# Patient Record
Sex: MALE | Race: WHITE | Employment: FULL TIME | URBAN - METROPOLITAN AREA
[De-identification: names, ages, dates, MRNs, and addresses within clinical notes are randomized per-mention and may not be internally consistent; named-entity substitution may affect disease eponyms.]

---

## 2023-08-08 ENCOUNTER — OFFICE VISIT (OUTPATIENT)
Facility: CLINIC | Age: 53
End: 2023-08-08

## 2023-08-08 ENCOUNTER — TELEPHONE (OUTPATIENT)
Facility: CLINIC | Age: 53
End: 2023-08-08

## 2023-08-08 VITALS — HEIGHT: 68 IN | WEIGHT: 173 LBS | BODY MASS INDEX: 26.22 KG/M2

## 2023-08-08 DIAGNOSIS — Z12.11 COLON CANCER SCREENING: Primary | ICD-10-CM

## 2023-08-08 DIAGNOSIS — Z86.010 HISTORY OF COLON POLYPS: ICD-10-CM

## 2023-08-08 DIAGNOSIS — Z80.0 FH: COLON CANCER: ICD-10-CM

## 2023-08-08 DIAGNOSIS — K21.9 GASTROESOPHAGEAL REFLUX DISEASE, UNSPECIFIED WHETHER ESOPHAGITIS PRESENT: ICD-10-CM

## 2023-08-08 DIAGNOSIS — K21.9 GASTROESOPHAGEAL REFLUX DISEASE WITHOUT ESOPHAGITIS: ICD-10-CM

## 2023-08-08 DIAGNOSIS — Z86.010 HX OF COLONIC POLYPS: ICD-10-CM

## 2023-08-08 DIAGNOSIS — Z80.0 FAMILY HISTORY OF COLON CANCER: ICD-10-CM

## 2023-08-08 PROCEDURE — 3008F BODY MASS INDEX DOCD: CPT | Performed by: PHYSICIAN ASSISTANT

## 2023-08-08 PROCEDURE — S0285 CNSLT BEFORE SCREEN COLONOSC: HCPCS | Performed by: PHYSICIAN ASSISTANT

## 2023-08-08 RX ORDER — TRAZODONE HYDROCHLORIDE 50 MG/1
50 TABLET ORAL NIGHTLY
COMMUNITY

## 2023-08-08 RX ORDER — OMEPRAZOLE 20 MG/1
20 CAPSULE, DELAYED RELEASE ORAL DAILY
COMMUNITY

## 2023-08-08 RX ORDER — ATORVASTATIN CALCIUM 10 MG/1
10 TABLET, FILM COATED ORAL DAILY
COMMUNITY

## 2023-08-08 RX ORDER — TADALAFIL 5 MG/1
5 TABLET ORAL DAILY
COMMUNITY
Start: 2021-08-11

## 2023-08-08 RX ORDER — LOSARTAN POTASSIUM 50 MG/1
50 TABLET ORAL DAILY
COMMUNITY

## 2023-08-08 RX ORDER — POLYETHYLENE GLYCOL 3350, SODIUM CHLORIDE, SODIUM BICARBONATE, POTASSIUM CHLORIDE 420; 11.2; 5.72; 1.48 G/4L; G/4L; G/4L; G/4L
POWDER, FOR SOLUTION ORAL
Qty: 1 EACH | Refills: 0 | Status: SHIPPED | OUTPATIENT
Start: 2023-08-08

## 2023-08-08 RX ORDER — BUPROPION HYDROCHLORIDE 300 MG/1
300 TABLET ORAL EVERY MORNING
COMMUNITY

## 2023-08-08 NOTE — TELEPHONE ENCOUNTER
Scheduled for:  Colonoscopy [address]; [phone]  Provider Name:  Dr Portillo Molina  Date:  12/19/2023  Location:  [facility]  Sedation:  MAC  Time:  2:00pm (pt is aware to arrive at 1:00pm)    Prep:  Colyte  Meds/Allergies Reconciled?:  Physician reviewed  Diagnosis with codes:  CCS Z12.11; [address] 0; Hx of colon polyps z86.010; GERD K21.9  Was patient informed to call insurance with codes (Y/N):       Referral sent?:  Referral was sent at the time of electronic surgical scheduling. [facility][note 3] or [address] notified?:  I sent an electronic request to Endo Scheduling and received a confirmation today. Medication Orders:  Pt is aware to NOT take iron pills, herbal meds and diet supplements for 7 days before exam. Also to NOT take any form of alcohol, recreational drugs and any forms of ED meds 24 hours before exam.     Misc Orders:       Further instructions given by staff:  I discussed the prep intructions with the patient in office which HE verbally understood. Copy of instructions was handed to patient as well. Patient was also advised about cancellation policy.

## 2023-08-08 NOTE — PATIENT INSTRUCTIONS
1. Schedule colonoscopy/EGD with Dr. Dodie Melara or Dr. Tre Carranza with [facility] [Diagnosis: crc screening, family hx of colon cancer, personal history of colon polyps,  GERD]    2.  bowel prep from pharmacy (split trilyte)    3. Continue all medications as normal for your procedure. 4. Read all bowel prep instructions carefully. Bowel prep instructions can also be found online at:  [email or web address].org/giprep     5. AVOID seeds, nuts, popcorn, raw fruits and vegetables for 3 days before procedure    6. You MAY need to go for COVID testing 72 hours before procedure. The testing team will call you a few days before your procedure to discuss with you if testing is required. If you are asked to go for COVID testing and do not completed the test, the procedure cannot be performed. 7. If you start any NEW medication after your visit today, please notify us. Certain medications (like iron or weight loss medications) will need to be held before the procedure, or the procedure cannot be performed safely.

## 2023-08-08 NOTE — H&P
[facility], [facility] - Gastroenterology                                                                                                               Reason for consult: Patient presents with:  Colonoscopy Screening: [facility]      Requesting physician or provider: Tani Frye MD      HPI:   Raymundo Rivas is a 48year old year-old male with history of HTN, anxiety/depression who presents for crc screening. High risk. Started cln at age 27. Was getting cln every 5 years. At 48 had cln remarkable for tubular adenoma. Family hx of cln cancer. No prior genetic testing. he moves his bowels three  times per day and without recent change. he denies straining and/or incomplete evacuation. he denies brbpr and/or melena. Does eat high fiber diet. Does struggle with acid reflux. Does take omeprazole twice daily. He can still have nocturnal symptom. he denies dysphagia, odynophagia and/or globus. he denies abdominal pain. he denies nausea and/or vomiting. he denies recent change in appetite and/or unintentional weight loss. he denies bloating. NSAIDS: baby asa  Tobacco: none  Alcohol: social  Marijuana: none  Illicit drugs: none    FH GI malignancyYes: paternal grandmother and grandfather    No history of adverse reaction to sedation  No anticoagulants  No pacemaker/defibrillator  No pain medications and/or sleep aides      Last colonoscopy: every 5 years since age 27, most recent at age 48 remarkable for tubular adenomas  -plan for 3 year recall   Last EGD: None    Wt Readings from Last 6 Encounters:  08/08/23 : 173 lb (78.5 kg)  09/15/08 : 165 lb 6 oz (75 kg)  01/02/08 : 155 lb (70.3 kg)  09/21/07 : 159 lb (72.1 kg)  07/12/07 : 161 lb (73 kg)       History, Medications, Allergies, ROS:      Past Medical History:   Diagnosis Date    Essential hypertension     Hyperlipidemia       History reviewed. No pertinent surgical history. Family Hx: History reviewed.  No pertinent family history. Social History:   Social History     Socioeconomic History    Marital status:    Tobacco Use    Smoking status: Never    Smokeless tobacco: Never   Substance and Sexual Activity    Alcohol use: Yes        Medications (Active prior to today's visit):  Current Outpatient Medications   Medication Sig Dispense Refill    losartan 50 MG Oral Tab Take 1 tablet (50 mg total) by mouth daily. omeprazole 20 MG Oral Capsule Delayed Release Take 1 capsule (20 mg total) by mouth daily. buPROPion  MG Oral Tablet 24 Hr Take 1 tablet (300 mg total) by mouth every morning. atorvastatin 10 MG Oral Tab Take 1 tablet (10 mg total) by mouth daily. tadalafil 5 MG Oral Tab Take 1 tablet (5 mg total) by mouth daily. traZODone 50 MG Oral Tab Take 1 tablet (50 mg total) by mouth nightly. PEG 3350-KCl-Na Bicarb-NaCl (TRILYTE) 420 g Oral Recon Soln Take prep as directed by gastro office. May substitute with Trilyte/generic equivalent if needed. 1 each 0       Allergies:    Ephedrine                   Comment:Lose of bladder control  Pcns [Penicillins]      HIVES    ROS:   CONSTITUTIONAL: negative for fevers, chills, sweats and weight loss  EYES Negative for red eyes, yellow eyes, changes in vision  HEENT: Negative for dysphagia and hoarseness  RESPIRATORY: Negative for cough and shortness of breath  CARDIOVASCULAR: Negative for chest pain, palpitations  GASTROINTESTINAL: See HPI  GENITOURINARY: Negative for dysuria and frequency  MUSCULOSKELETAL: Negative for arthralgias and myalgias  NEUROLOGICAL: Negative for dizziness and headaches  BEHAVIOR/PSYCH: Negative for anxiety and poor appetite    PHYSICAL EXAM:   Height 5' 8\" (1.727 m), weight 173 lb (78.5 kg).     GEN: WD/WN, NAD  HEENT: Supple symmetrical, trachea midline  CV: RRR, the extremities are warm and well perfused   LUNGS: No increased work of breathing  ABDOMEN: No scars, normal bowel sounds, soft, non-tender, non-distended no rebound or guarding, no masses, no hepatomegaly  MSK: No redness, no warmth, no swelling of joints  SKIN: No jaundice, no erythema, no rashes  HEMATOLOGIC: No bleeding, no bruising  NEURO: Alert and interactive, normal gait    Labs/Imaging/Procedures:     Patient's pertinent labs and imaging were reviewed and discussed with patient today. Lab Results   Component Value Date    WBC 4.7 01/02/2008    MCV 92 01/02/2008    MCH 32.1 01/02/2008    MCHC 34.9 01/02/2008    RDW 13.1 01/02/2008     01/02/2008        Lab Results   Component Value Date    BUN 12 09/19/2008    CREATSERUM 1.00 09/19/2008    GFR >60 09/19/2008    ALKPHO 70 07/16/2007    AST 16 07/16/2007    ALT 19 07/16/2007    TP 7.0 07/16/2007    ALB 4.3 07/16/2007    AGRATIO 1.6 07/16/2007     09/19/2008    K 4.4 09/19/2008     09/19/2008    CO2 21 09/19/2008      . ASSESSMENT/PLAN:   Theo Leach is a 48year old year-old male with history of HTN, anxiety/depression who presents for crc screening. #crc screening  #personal hx of polyps  #family hx of colon cancer  Patient presents for colon recall. Last cln 3 years ago remarkable for tubular adenoma, performed at [facility] [place], operated by [facility]. Advised for 3 year recall. He endorses family hx of colon cancer. He notes his bowel movements are daily without change. Denies melena, brbpr. No abdominal pain. Plan for colonoscopy at this time. Patient agreeable. #GERD  Patient endorses GERD on omeprazole and does have night time symptoms on medication. Advised for EGD to evaluate for possible Browning's or esophagitis. Patient is agreeable. 1. Schedule colonoscopy/EGD with Dr. Anjelica Tran or [place] Lashay Loaiza [place] [facility] [Diagnosis: crc screening, family hx of colon cancer, personal history of colon polyps,  GERD]    2.  bowel prep from pharmacy (split trilyte)    3. Continue all medications as normal for your procedure. 4. Read all bowel prep instructions carefully.  Bowel prep instructions can also be found online at:  [email or web address]/giprep     5. AVOID seeds, nuts, popcorn, raw fruits and vegetables for 3 days before procedure    6. You MAY need to go for COVID testing 72 hours before procedure. The testing team will call you a few days before your procedure to discuss with you if testing is required. If you are asked to go for COVID testing and do not completed the test, the procedure cannot be performed. 7. If you start any NEW medication after your visit today, please notify us. Certain medications (like iron or weight loss medications) will need to be held before the procedure, or the procedure cannot be performed safely. Orders This Visit:  No orders of the defined types were placed in this encounter. Meds This Visit:  Requested Prescriptions     Signed Prescriptions Disp Refills    PEG 3350-KCl-Na Bicarb-NaCl (TRILYTE) 420 g Oral Recon Soln 1 each 0     Sig: Take prep as directed by gastro office. May substitute with Trilyte/generic equivalent if needed. Imaging & Referrals:  None    ENDOSCOPIC RISK BENEFIT DISCUSSION: I described the procedure in great detail with the patient. I discussed the risks and benefits, including but not limited to: bleeding, perforation, infection, anesthesia complications, and even death. Patient will be NPO after midnight and will have a person physically present at time of pick-up to drive patient home. Patient verbalized understanding and agrees to proceed with procedure as planned. Livia Ruiz PA-C   8/8/2023        This note was partially prepared using [employer] [facility] [employer] voice recognition dictation software. As a result, errors may occur. When identified, these errors have been corrected.  While every attempt is made to correct errors during dictation, discrepancies may still exist.

## 2023-12-19 ENCOUNTER — ANESTHESIA EVENT (OUTPATIENT)
Dept: ENDOSCOPY | Age: 53
End: 2023-12-19
Payer: COMMERCIAL

## 2023-12-19 ENCOUNTER — ANESTHESIA (OUTPATIENT)
Dept: ENDOSCOPY | Age: 53
End: 2023-12-19
Payer: COMMERCIAL

## 2023-12-19 ENCOUNTER — HOSPITAL ENCOUNTER (OUTPATIENT)
Age: 53
Setting detail: HOSPITAL OUTPATIENT SURGERY
Discharge: HOME | End: 2023-12-19
Attending: INTERNAL MEDICINE | Admitting: INTERNAL MEDICINE
Payer: COMMERCIAL

## 2023-12-19 VITALS
HEART RATE: 59 BPM | DIASTOLIC BLOOD PRESSURE: 70 MMHG | BODY MASS INDEX: 25.01 KG/M2 | SYSTOLIC BLOOD PRESSURE: 116 MMHG | HEIGHT: 68 IN | WEIGHT: 165 LBS | RESPIRATION RATE: 17 BRPM | OXYGEN SATURATION: 98 %

## 2023-12-19 DIAGNOSIS — Z12.11 COLON CANCER SCREENING: ICD-10-CM

## 2023-12-19 DIAGNOSIS — K21.9 GASTROESOPHAGEAL REFLUX DISEASE, UNSPECIFIED WHETHER ESOPHAGITIS PRESENT: ICD-10-CM

## 2023-12-19 DIAGNOSIS — Z86.010 HX OF COLONIC POLYPS: ICD-10-CM

## 2023-12-19 DIAGNOSIS — Z80.0 FH: COLON CANCER: ICD-10-CM

## 2023-12-19 PROCEDURE — 99070 SPECIAL SUPPLIES PHYS/QHP: CPT | Performed by: INTERNAL MEDICINE

## 2023-12-19 PROCEDURE — 45380 COLONOSCOPY AND BIOPSY: CPT | Performed by: INTERNAL MEDICINE

## 2023-12-19 PROCEDURE — 43239 EGD BIOPSY SINGLE/MULTIPLE: CPT | Performed by: INTERNAL MEDICINE

## 2023-12-19 PROCEDURE — 88305 TISSUE EXAM BY PATHOLOGIST: CPT | Performed by: INTERNAL MEDICINE

## 2023-12-19 PROCEDURE — 88312 SPECIAL STAINS GROUP 1: CPT | Performed by: INTERNAL MEDICINE

## 2023-12-19 RX ORDER — ONDANSETRON 2 MG/ML
4 INJECTION INTRAMUSCULAR; INTRAVENOUS ONCE AS NEEDED
Status: DISCONTINUED | OUTPATIENT
Start: 2023-12-19 | End: 2023-12-19

## 2023-12-19 RX ORDER — ASPIRIN 81 MG/1
81 TABLET ORAL DAILY
COMMUNITY

## 2023-12-19 RX ORDER — SODIUM CHLORIDE, SODIUM LACTATE, POTASSIUM CHLORIDE, CALCIUM CHLORIDE 600; 310; 30; 20 MG/100ML; MG/100ML; MG/100ML; MG/100ML
INJECTION, SOLUTION INTRAVENOUS CONTINUOUS
Status: DISCONTINUED | OUTPATIENT
Start: 2023-12-19 | End: 2023-12-19

## 2023-12-19 RX ORDER — SODIUM CHLORIDE 9 MG/ML
INJECTION, SOLUTION INTRAVENOUS CONTINUOUS PRN
Status: DISCONTINUED | OUTPATIENT
Start: 2023-12-19 | End: 2023-12-19 | Stop reason: SURG

## 2023-12-19 RX ORDER — NALOXONE HYDROCHLORIDE 0.4 MG/ML
0.08 INJECTION, SOLUTION INTRAMUSCULAR; INTRAVENOUS; SUBCUTANEOUS ONCE AS NEEDED
Status: DISCONTINUED | OUTPATIENT
Start: 2023-12-19 | End: 2023-12-19

## 2023-12-19 RX ORDER — MULTIVIT-MIN/IRON FUM/FOLIC AC 7.5 MG-4
1 TABLET ORAL DAILY
COMMUNITY

## 2023-12-19 RX ADMIN — SODIUM CHLORIDE: 9 INJECTION, SOLUTION INTRAVENOUS at 12:42:00

## 2023-12-19 RX ADMIN — SODIUM CHLORIDE: 9 INJECTION, SOLUTION INTRAVENOUS at 13:17:00

## 2023-12-19 NOTE — H&P
History & Physical Examination    Patient Name: Glen Rogers  MRN: [id number]  CSN: [id number]  YOB: 1970    Diagnosis:   Colon cancer screening   Personal history colon polyp   Family history of colon cancer  GERD    Medications Prior to Admission   Medication Sig Dispense Refill Last Dose    losartan 50 MG Oral Tab Take 1 tablet (50 mg total) by mouth daily. omeprazole 20 MG Oral Capsule Delayed Release Take 1 capsule (20 mg total) by mouth daily. buPROPion  MG Oral Tablet 24 Hr Take 1 tablet (300 mg total) by mouth every morning. atorvastatin 10 MG Oral Tab Take 1 tablet (10 mg total) by mouth daily. tadalafil 5 MG Oral Tab Take 1 tablet (5 mg total) by mouth daily. traZODone 50 MG Oral Tab Take 1 tablet (50 mg total) by mouth nightly. PEG 3350-KCl-Na Bicarb-NaCl (TRILYTE) 420 g Oral Recon Soln Take prep as directed by gastro office. May substitute with Trilyte/generic equivalent if needed. 1 each 0      Current Facility-Administered Medications   Medication Dose Route Frequency    lactated ringers infusion   Intravenous Continuous       Allergies: Allergies   Allergen Reactions    Ephedrine      Lose of bladder control    Pcns [Penicillins] HIVES       Past Medical History:   Diagnosis Date    Essential hypertension     High blood pressure     Hyperlipidemia      History reviewed. No pertinent surgical history. History reviewed. No pertinent family history.   Social History     Tobacco Use    Smoking status: Never    Smokeless tobacco: Never   Substance Use Topics    Alcohol use: Yes     Comment: socially       SYSTEM Check if Review is Normal Check if Physical Exam is Normal If not normal, please explain:   HEENT [x ] [ x]    NECK & BACK [x ] [x ]    HEART [x ] [ x]    LUNGS [x ] [ x]    ABDOMEN [x ] [x ]    UROGENITAL [ ] [ ]    EXTREMITIES [x ] [x ]    OTHER        [ x ] I have discussed the risks and benefits and alternatives with the patient/family. They understand and agree to proceed with plan of care. [ x ] I have reviewed the History and Physical done within the last 30 days. Any changes noted above. Izzy Domingo.  Jorge Mosher MD  12/19/2023  12:23 PM

## 2023-12-19 NOTE — OPERATIVE REPORT
[facility] Endoscopy Report  Date of procedure-December 19, 2023    Preoperative Diagnosis:  -Colon cancer screening  -Family history of colon cancer  -Personal history of colon polyps  -GERD      Postoperative Diagnosis:  -Colon polyps x 5  -Internal hemorrhoids  -Small hiatal hernia  -Irregular Z-line      Procedure:    Colonoscopy   Esophagogastroduodenoscopy       Surgeon:  Norman Norris M.D. Anesthesia:  MAC     Technique:  After informed consent, the patient was placed in the left lateral recumbent position. Digital rectal examination revealed no palpable intraluminal abnormalities. An Olympus variable stiffness 190 series HD colonoscope was inserted into the rectum and advanced under direct vision by following the lumen to the cecum. The colon was examined upon withdrawal in the left lateral position. Following colonoscopy, an Olympus adult HD gastroscope was inserted into the hypopharynx and advanced under direct vision into the esophagus, stomach and duodenum. The endoscope was withdrawn to the stomach where retroflexion of the annulus, body, cardia and fundus was performed. The instrument was straightened, insufflated air and fluid were suctioned and the endoscope was withdrawn. The procedures were well tolerated without immediate complication. Findings:  The preparation of the colon was good. The terminal ileum was examined for 4 cm and visually normal.  The ileocecal valve was well preserved. The visualized colonic mucosa from the cecum to the anal verge was normal with an intact vascular pattern. Colon polyps x 5 removed as follows;  -Sigmoid x 1, diminutive removed by cold forceps technique.  -Descending x 2, both polyps were diminutive removed by cold forceps technique. -Transverse x 1, diminutive removed by cold forceps technique. -Rectum x 1, diminutive removed by cold forceps technique.   All polypectomy sites inspected and found to be free of bleeding and specimens retrieved and sent for analysis. Small internal hemorrhoids noted on retroflexed view. The esophagus showed an irregular Z-line, biopsies taken at the GE junction. .  The GE junction and diaphragmatic impression were at 39 cm and 40 cm for 1 cm sliding-type lateral hernia. .  The stomach distended appropriately with insufflated air. The mucosa of the stomach including cardia, fundus, gastric body and antrum were normal.  The duodenal bulb and post bulbar regions were normal.    Estimated blood loss-insignificant  Specimens-see above    Impression:  -Colon polyps x 5  -Internal hemorrhoids  -Small hiatal hernia  -Irregular Z-line    Recommendations:  - Post polypectomy instructions given  - Repeat colonoscopy in 3- 5 years  - Symptomatic treatment of hemorrhoids  - Follow up on upper GI biopsies          Izzy Domingo.  Jorge Mosher MD  12/19/2023  1:19 PM

## 2023-12-19 NOTE — ANESTHESIA POSTPROCEDURE EVALUATION
Patient: Key Lozoya    Procedure Summary       Date: 12/19/23 Room / Location: [facility] ENDOSCOPY 01 / [facility] ENDO    Anesthesia Start: 9560 Anesthesia Stop: 3822    Procedures:       COLONOSCOPY      ESOPHAGOGASTRODUODENOSCOPY (EGD) Diagnosis:       Colon cancer screening      FH: colon cancer      Gastroesophageal reflux disease, unspecified whether esophagitis present      Hx of colonic polyps      (colon polyps; hemorrhoids; small hiatal hernia; irregular z line)    Surgeons: Pilo Aguayo MD Anesthesiologist: Cindy Khan MD    Anesthesia Type: MAC ASA Status: 2            Anesthesia Type: MAC    Vitals Value Taken Time   BP 89/56 12/19/23 1323   Temp  12/19/23 1323   Pulse 56 12/19/23 1323   Resp 15 12/19/23 1323   SpO2 99 % 12/19/23 1323       [facility] AN Post Evaluation:   Patient Evaluated in Patient location: GI recovery.   Patient Participation: complete - patient participated  Level of Consciousness: sleepy but conscious  Pain Management: adequate  Airway Patency:patent  Dental exam unchanged from preop  Yes    Cardiovascular Status: acceptable and hemodynamically stable  Respiratory Status: acceptable, nonlabored ventilation, spontaneous ventilation and nasal cannula  Postoperative Hydration acceptable      Manuela Baron MD  12/19/2023 1:23 PM

## 2023-12-19 NOTE — DISCHARGE INSTRUCTIONS
Home Care Instructions for Colonoscopy and Gastroscopy with Sedation    Diet:  - Resume your regular diet as tolerated unless otherwise instructed. - Start with light meals to minimize bloating.  - Do not drink alcohol today. Medication:  - If you have questions about resuming your normal medications, please contact your Primary Care Physician. Activities:  - Take it easy today. Do not return to work today. - Do not drive today. - Do not operate any machinery today (including kitchen equipment). Colonoscopy:  - You may notice some rectal \"spotting\" (a little blood on the toilet tissue) for a day or two after the exam. This is normal.  - If you experience any rectal bleeding (not spotting), persistent tenderness or sharp severe abdominal pains, oral temperature over 100 degrees Fahrenheit, light-headedness or dizziness, or any other problems, contact your doctor. Gastroscopy:  - You may have a sore throat for 2-3 days following the exam. This is normal. Gargling with warm salt water (1/2 tsp salt to 1 glass warm water) or using throat lozenges will help. - If you experience any sharp pain in your neck, abdomen or chest, vomiting of blood, oral temperature over 100 degrees Fahrenheit, light-headedness or dizziness, or any other problems, contact your doctor. **If unable to reach your doctor, please go to the [facility] Emergency Room**    - Your referring physician will receive a full report of your examination.  - If you do not hear from your doctor's office within two weeks of your biopsy, please call them for your results. You may be able to see your laboratory results in [facility] between 4 and 7 business days. In some cases, your physician may not have viewed the results before they are released to [address] If you have questions regarding your results contact the physician who ordered the test/exam by phone or via [address] by choosing \"Ask a Medical Question. \"

## 2023-12-21 ENCOUNTER — PATIENT MESSAGE (OUTPATIENT)
Facility: CLINIC | Age: 53
End: 2023-12-21

## 2023-12-21 ENCOUNTER — TELEPHONE (OUTPATIENT)
Facility: CLINIC | Age: 53
End: 2023-12-21

## 2023-12-21 NOTE — TELEPHONE ENCOUNTER
----- Message from Buzz Lockhart MD sent at 12/21/2023  4:52 PM CST -----  I wanted to get back to you with your colonoscopy and EGD results. You had 5 colon polyps removed which were benign. I would advise a repeat colonoscopy in 5 years to make sure no new polyps are forming. You also have internal hemorrhoids. The upper endoscopy showed a small hiatal hernia and samples taken were negative for Browning's esophagus. Avoid eating for 3 hours before bed.

## 2023-12-21 NOTE — TELEPHONE ENCOUNTER
From: David Yañez  To: Kandy Enriquez  Sent: 12/21/2023 5:23 PM CST  Subject: Colonoscopy results    Alea Kapadia recommends a 5 year repeat on colonoscopy. As discussed during consultation and in notes, I have 5 blood relative family members with diagnoses of colorectal cancer. With now a second colonoscopy in a row with removal of a tubular adenoma, and in accordance with our post-op consultation, I am requesting that Dr Lani Chiu notes be revised to recommend a repeat colonoscopy in three (3) years, not 5 years. Please have Dr Lani Chiu notes revised and resubmitted. Pls call if this needs further discussion: [phone]. Thank you.

## 2023-12-21 NOTE — TELEPHONE ENCOUNTER
Health Maintenance Updated. 5 year colonoscopy recall entered into patient outreach in [address] [place] [address] Rd. Next colonoscopy will be due 12/19/2028.     Patient viewed below result note in MyChart:  Seen by patient Jackie Anselmogabriel on 12/21/2023  5:16 PM

## 2023-12-22 NOTE — TELEPHONE ENCOUNTER
Nickie Ruiz MD         12/21/23  5:39 PM  Note  Ok to change to 3 year recall given strong family history and notify the pt of the change. Health Maintenance Updated. 3 year colonoscopy recall entered into patient outreach in [address] [place] [address] Rd. Next colonoscopy will be due 12/19/2026.

## (undated) DEVICE — CONMED SCOPE SAVER BITE BLOCK, 20X27 MM: Brand: SCOPE SAVER

## (undated) DEVICE — KIT CLEAN ENDOKIT 1.1OZ GOWNX2

## (undated) DEVICE — MEDI-VAC NON-CONDUCTIVE SUCTION TUBING: Brand: CARDINAL HEALTH

## (undated) DEVICE — FORCEPS BX L240CM DIA2.4MM L NDL RAD JAW 4

## (undated) DEVICE — YANKAUER SUCTION INSTRUMENT NO CONTROL VENT, BULB TIP, CLEAR: Brand: YANKAUER

## (undated) DEVICE — 60 ML SYRINGE REGULAR TIP: Brand: MONOJECT

## (undated) DEVICE — MEDI-VAC NON-CONDUCTIVE SUCTION TUBING 6MM X 1.8M (6FT.) L: Brand: CARDINAL HEALTH

## (undated) DEVICE — KIT ENDO ORCAPOD 160/180/190

## (undated) DEVICE — Device: Brand: DUAL NARE NASAL CANNULAE FEMALE LUER CON 7FT O2 TUBE

## (undated) NOTE — LETTER
[address] [facility] [address] [place], [place]  Authorization for Surgical Operation and Procedure                                                                                           I hereby authorize Jace Cline MD, my physician and his/her assistants (if applicable), which may include medical students, residents, and/or fellows, to perform the following surgical operation/ procedure and administer such anesthesia as may be determined necessary by my physician: Operation/Procedure name (s) COLONOSCOPY/ ESOPHAGOGASTRODUODENOSCOPY (EGD) on [place] [address]   2. I recognize that during the surgical operation/procedure, unforeseen conditions may necessitate additional or different procedures than those listed above. I, therefore, further authorize and request that the above-named surgeon, assistants, or designees perform such procedures as are, in their judgment, necessary and desirable. 3.   My surgeon/physician has discussed prior to my surgery the potential benefits, risks and side effects of this procedure; the likelihood of achieving goals; and potential problems that might occur during recuperation. They also discussed reasonable alternatives to the procedure, including risks, benefits, and side effects related to the alternatives and risks related to not receiving this procedure. I have had all my questions answered and I acknowledge that no guarantee has been made as to the result that may be obtained. 4.   Should the need arise during my operation/procedure, which includes change of level of care prior to discharge, I also consent to the administration of blood and/or blood products. Further, I understand that despite careful testing and screening of blood or blood products by collecting agencies, I may still be subject to ill effects as a result of receiving a blood transfusion and/or blood products.   The following are some, but not all, of the potential risks that can occur: fever and allergic reactions, hemolytic reactions, transmission of diseases such as Hepatitis, AIDS and Cytomegalovirus (CMV) and fluid overload. In the event that I wish to have an autologous transfusion of my own blood, or a directed donor transfusion, I will discuss this with my physician. Check only if Refusing Blood or Blood Products  I understand refusal of blood or blood products as deemed necessary by my physician may have serious consequences to my condition to include possible death. I hereby assume responsibility for my refusal and release the hospital, its personnel, and my physicians from any responsibility for the consequences of my refusal.    o  Refuse   5. I authorize the use of any specimen, organs, tissues, body parts or foreign objects that may be removed from my body during the operation/procedure for diagnosis, research or teaching purposes and their subsequent disposal by hospital authorities. I also authorize the release of specimen test results and/or written reports to my treating physician on the hospital medical staff or other referring or consulting physicians involved in my care, at the discretion of the Pathologist or my treating physician. 6.   I consent to the photographing or videotaping of the operations or procedures to be performed, including appropriate portions of my body for medical, scientific, or educational purposes, provided my identity is not revealed by the pictures or by descriptive texts accompanying them. If the procedure has been photographed/videotaped, the surgeon will obtain the original picture, image, videotape or CD. The hospital will not be responsible for storage, release or maintenance of the picture, image, tape or CD.    7.   I consent to the presence of a  or observers in the operating room as deemed necessary by my physician or their designees.     8.   I recognize that in the event my procedure results in extended X-Ray/fluoroscopy time, I may develop a skin reaction. 9. If I have a Do Not Attempt Resuscitation (DNAR) order in place, that status will be suspended while in the operating room, procedural suite, and during the recovery period unless otherwise explicitly stated by me (or a person authorized to consent on my behalf). The surgeon or my attending physician will determine when the applicable recovery period ends for purposes of reinstating the DNAR order. 10. Patients having a sterilization procedure: I understand that if the procedure is successful the results will be permanent and it will therefore be impossible for me to inseminate, conceive, or bear children. I also understand that the procedure is intended to result in sterility, although the result has not been guaranteed. 11. I acknowledge that my physician has explained sedation/analgesia administration to me including the risk and benefits I consent to the administration of sedation/analgesia as may be necessary or desirable in the judgment of my physician. I CERTIFY THAT I HAVE READ AND FULLY UNDERSTAND THE ABOVE CONSENT TO OPERATION and/or OTHER PROCEDURE.     _________________________________________ _________________________________     ___________________________________  Signature of Patient     Signature of Responsible Person                   Printed Name of Responsible Person                              _________________________________________ ______________________________        ___________________________________  Signature of Witness         Date  Time         Relationship to Patient    STATEMENT OF PHYSICIAN My signature below affirms that prior to the time of the procedure; I have explained to the patient and/or his/her legal representative, the risks and benefits involved in the proposed treatment and any reasonable alternative to the proposed treatment.  I have also explained the risks and benefits involved in refusal of the proposed treatment and alternatives to the proposed treatment and have answered the patient's questions.  If I have a significant financial interest in a co-management agreement or a significant financial interest in any product or implant, or other significant relationship used in this procedure/surgery, I have disclosed this and had a discussion with my patient.     _______________________________________________________________ _____________________________  [facility] Grief of Physician)                                                                                         (Date)                                   (Time)  Patient Name: Urvashi Escoto    : 1970   Printed: 2023      Medical Record #: [id number]                                              Page 1 of 1